# Patient Record
Sex: FEMALE | Race: BLACK OR AFRICAN AMERICAN | Employment: FULL TIME | URBAN - METROPOLITAN AREA
[De-identification: names, ages, dates, MRNs, and addresses within clinical notes are randomized per-mention and may not be internally consistent; named-entity substitution may affect disease eponyms.]

---

## 2022-03-19 PROBLEM — E66.9 SUPER OBESITY: Status: ACTIVE | Noted: 2021-11-24

## 2023-07-27 DIAGNOSIS — K91.2 POSTOPERATIVE MALABSORPTION: Primary | ICD-10-CM

## 2023-08-16 LAB
25(OH)D3+25(OH)D2 SERPL-MCNC: 32.2 NG/ML (ref 30–100)
ALBUMIN SERPL-MCNC: 4.1 G/DL (ref 3.9–4.9)
ALBUMIN/GLOB SERPL: 1.7 {RATIO} (ref 1.2–2.2)
ALP SERPL-CCNC: 62 IU/L (ref 44–121)
ALT SERPL-CCNC: 15 IU/L (ref 0–32)
AST SERPL-CCNC: 20 IU/L (ref 0–40)
BASOPHILS # BLD AUTO: 0.1 X10E3/UL (ref 0–0.2)
BASOPHILS NFR BLD AUTO: 1 %
BILIRUB DIRECT SERPL-MCNC: <0.1 MG/DL (ref 0–0.4)
BILIRUB SERPL-MCNC: 0.3 MG/DL (ref 0–1.2)
BUN SERPL-MCNC: 11 MG/DL (ref 6–24)
BUN/CREAT SERPL: 15 (ref 9–23)
CALCIUM SERPL-MCNC: 8.9 MG/DL (ref 8.7–10.2)
CHLORIDE SERPL-SCNC: 107 MMOL/L (ref 96–106)
CHOLEST SERPL-MCNC: 162 MG/DL (ref 100–199)
CO2 SERPL-SCNC: 19 MMOL/L (ref 20–29)
CREAT SERPL-MCNC: 0.72 MG/DL (ref 0.57–1)
EGFRCR SERPLBLD CKD-EPI 2021: 103 ML/MIN/1.73
EOSINOPHIL # BLD AUTO: 0.2 X10E3/UL (ref 0–0.4)
EOSINOPHIL NFR BLD AUTO: 3 %
ERYTHROCYTE [DISTWIDTH] IN BLOOD BY AUTOMATED COUNT: 14.5 % (ref 11.7–15.4)
FERRITIN SERPL-MCNC: 11 NG/ML (ref 15–150)
GLOBULIN SER CALC-MCNC: 2.4 G/DL (ref 1.5–4.5)
GLUCOSE SERPL-MCNC: 78 MG/DL (ref 70–99)
HBA1C MFR BLD: 5.4 % (ref 4.8–5.6)
HCT VFR BLD AUTO: 33.4 % (ref 34–46.6)
HDLC SERPL-MCNC: 69 MG/DL
HGB BLD-MCNC: 10.5 G/DL (ref 11.1–15.9)
IMM GRANULOCYTES # BLD AUTO: 0 X10E3/UL (ref 0–0.1)
IMM GRANULOCYTES NFR BLD AUTO: 0 %
IRON SERPL-MCNC: 22 UG/DL (ref 27–159)
LDLC SERPL CALC-MCNC: 78 MG/DL (ref 0–99)
LYMPHOCYTES # BLD AUTO: 2.5 X10E3/UL (ref 0.7–3.1)
LYMPHOCYTES NFR BLD AUTO: 41 %
MAGNESIUM SERPL-MCNC: 2.1 MG/DL (ref 1.6–2.3)
MCH RBC QN AUTO: 27.2 PG (ref 26.6–33)
MCHC RBC AUTO-ENTMCNC: 31.4 G/DL (ref 31.5–35.7)
MCV RBC AUTO: 87 FL (ref 79–97)
MONOCYTES # BLD AUTO: 0.5 X10E3/UL (ref 0.1–0.9)
MONOCYTES NFR BLD AUTO: 9 %
NEUTROPHILS # BLD AUTO: 2.8 X10E3/UL (ref 1.4–7)
NEUTROPHILS NFR BLD AUTO: 46 %
PLATELET # BLD AUTO: 232 X10E3/UL (ref 150–450)
POTASSIUM SERPL-SCNC: 4 MMOL/L (ref 3.5–5.2)
PROT SERPL-MCNC: 6.5 G/DL (ref 6–8.5)
RBC # BLD AUTO: 3.86 X10E6/UL (ref 3.77–5.28)
SODIUM SERPL-SCNC: 142 MMOL/L (ref 134–144)
SPECIMEN STATUS REPORT: NORMAL
TRIGL SERPL-MCNC: 77 MG/DL (ref 0–149)
TSH SERPL DL<=0.005 MIU/L-ACNC: 2.46 UIU/ML (ref 0.45–4.5)
VIT B12 SERPL-MCNC: 1830 PG/ML (ref 232–1245)
VLDLC SERPL CALC-MCNC: 15 MG/DL (ref 5–40)
WBC # BLD AUTO: 6 X10E3/UL (ref 3.4–10.8)

## 2023-08-18 LAB — VIT B1 BLD-SCNC: 121.7 NMOL/L (ref 66.5–200)

## 2023-08-28 ENCOUNTER — OFFICE VISIT (OUTPATIENT)
Age: 48
End: 2023-08-28
Payer: COMMERCIAL

## 2023-08-28 VITALS
BODY MASS INDEX: 40.64 KG/M2 | OXYGEN SATURATION: 100 % | SYSTOLIC BLOOD PRESSURE: 135 MMHG | HEART RATE: 56 BPM | WEIGHT: 229.4 LBS | HEIGHT: 63 IN | TEMPERATURE: 98.1 F | RESPIRATION RATE: 16 BRPM | DIASTOLIC BLOOD PRESSURE: 72 MMHG

## 2023-08-28 DIAGNOSIS — L98.7 EXCESSIVE AND REDUNDANT SKIN AND SUBCUTANEOUS TISSUE: ICD-10-CM

## 2023-08-28 DIAGNOSIS — Z98.84 S/P GASTRIC BYPASS: ICD-10-CM

## 2023-08-28 DIAGNOSIS — K91.2 POST-RESECTION MALABSORPTION: Primary | ICD-10-CM

## 2023-08-28 DIAGNOSIS — E66.01 MORBID OBESITY (HCC): ICD-10-CM

## 2023-08-28 PROCEDURE — 99213 OFFICE O/P EST LOW 20 MIN: CPT | Performed by: NURSE PRACTITIONER

## 2023-08-28 ASSESSMENT — ENCOUNTER SYMPTOMS
BLOOD IN STOOL: 0
ABDOMINAL PAIN: 0
NAUSEA: 0
CONSTIPATION: 0
DIARRHEA: 0
VOMITING: 0

## 2023-08-28 NOTE — PROGRESS NOTES
Bariatric Postoperative Progress Note    CC: Annual Bariatric Follow Up    Francia Matos is a 49 y.o. female now 21 months status post laparoscopic gastric bypass surgery performed on 11/24/2021. Lost to follow up since 3 month post-op visit. Diet consists of protein shake, tuna, yogurt, chicken, vegetables, peanut butter crackers. Taking in 60 oz sugar free fluids,  30+ g protein. Walking twice weekly. Bowel movements are regular. She is compliant with recommended bariatric vitamin supplementation, taking Flintstones MVI 2-3 daily and B complex, not taking calcium. Going to [facility] and getting B12 & vitamin D injection 2-3 times monthly  Has been struggling with depression on and off since surgery. She reports periods of time where she will not want to get out of bed and will sleep all day. Denies SI/HI and will follow up with PCP for this on 8/31  Experiencing itching and skin irritation under pannus from excess skin chafing even with fastidious skin care, use of support garments, and Nystatin. Goal weight 215  Denies any NSAID, ETOH, or tobacco use. Weight Loss Metrics 8/28/2023 2/28/2022 12/10/2021 11/23/2021 10/15/2021 4/2/2021   Pre-op weight (manual entry) 309 lbs - - - - -   Height 5' 3\" 5' 3\" 5' 3\" 5' 3\" 5' 3\" 5' 3\"   Weight 229 lbs 6 oz 265 lbs 292 lbs 309 lbs 309 lbs 320 lbs   BMI (Calculated) 40.7 kg/m2 47 kg/m2 51.8 kg/m2 54.9 kg/m2 54.9 kg/m2 56.8 kg/m2   Ideal body weight (manual entry) 128 lbs - - - - -   EBW in lbs. 181 - - - - -   Weight loss to date in lbs.  79 - - - - -   Percent weight loss (%) 25.76 % - - - - -   Percent EBW loss (%) 43.6 % - - - - -       Comorbidities:  Hypertension: resolved  Diabetes: not applicable  Obstructive Sleep Apnea: resolved  Hyperlipidemia: not applicable  Stress Urinary Incontinence: not applicable  Gastroesophageal Reflux: not applicable  Weight related arthropathy:improved      Past Medical History:   Diagnosis Date    Asthma

## 2023-08-29 ASSESSMENT — ENCOUNTER SYMPTOMS
WHEEZING: 0
EYES NEGATIVE: 1
ABDOMINAL DISTENTION: 0
SHORTNESS OF BREATH: 0
COUGH: 0

## 2023-10-23 ENCOUNTER — OFFICE VISIT (OUTPATIENT)
Age: 48
End: 2023-10-23
Payer: COMMERCIAL

## 2023-10-23 VITALS
WEIGHT: 224 LBS | HEIGHT: 63 IN | OXYGEN SATURATION: 100 % | TEMPERATURE: 97 F | BODY MASS INDEX: 39.69 KG/M2 | SYSTOLIC BLOOD PRESSURE: 114 MMHG | HEART RATE: 59 BPM | DIASTOLIC BLOOD PRESSURE: 83 MMHG

## 2023-10-23 DIAGNOSIS — K90.821 SHORT BOWEL SYNDROME WITH COLON IN CONTINUITY: Primary | ICD-10-CM

## 2023-10-23 DIAGNOSIS — R53.83 OTHER FATIGUE: ICD-10-CM

## 2023-10-23 DIAGNOSIS — E66.9 CLASS 2 OBESITY WITHOUT SERIOUS COMORBIDITY WITH BODY MASS INDEX (BMI) OF 39.0 TO 39.9 IN ADULT, UNSPECIFIED OBESITY TYPE: ICD-10-CM

## 2023-10-23 DIAGNOSIS — Z71.3 ENCOUNTER FOR WEIGHT LOSS COUNSELING: ICD-10-CM

## 2023-10-23 DIAGNOSIS — Z98.84 S/P GASTRIC BYPASS: ICD-10-CM

## 2023-10-23 PROCEDURE — 99213 OFFICE O/P EST LOW 20 MIN: CPT | Performed by: NURSE PRACTITIONER

## 2023-10-23 RX ORDER — NEEDLES, DISPOSABLE 25GX5/8"
NEEDLE, DISPOSABLE MISCELLANEOUS
Qty: 10 EACH | Refills: 0 | Status: CANCELLED | OUTPATIENT
Start: 2023-10-23

## 2023-10-23 RX ORDER — ESCITALOPRAM OXALATE 20 MG/1
20 TABLET ORAL
COMMUNITY
Start: 2023-10-14

## 2023-10-23 RX ORDER — CYANOCOBALAMIN 1000 UG/ML
1000 INJECTION, SOLUTION INTRAMUSCULAR; SUBCUTANEOUS
Qty: 3 ML | Refills: 1 | Status: SHIPPED | OUTPATIENT
Start: 2023-10-23

## 2023-10-23 NOTE — PROGRESS NOTES
Bariatric Postoperative Note    CC: Weight Management    Kim Allen is a 49 y.o. female now 23 months status post laparoscopic gastric bypass surgery performed on 11/24/21.  -5 lbs  Doing well overall. Diet consists of deli meat, tuna, chicken, salad, bread, pasta, occ chips, crackers. Taking in 50 oz sugar free fluids,  unknown g protein. Not currently exercising. Bowel movements are regular. She is compliant with recommended bariatric vitamin supplementation. Has been logging food intake for past week, brought in for review. Focusing on protein and avoiding snacking. PCP started on Lexapro and she will start working with a therapist next week. 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 Continues to experience itching and skin irritation under pannus from excess skin chafing even with fastidious skin care, use of support garments, and Nystatin. She was referred to Dr. Ahmet Pina for panniculectomy, requesting office notes to be faxed to his office. 10/23/2023    10:03 AM 8/28/2023     1:16 PM 2/28/2022     9:43 AM 12/10/2021     1:58 PM 11/23/2021     9:40 AM 10/15/2021     3:13 PM 4/2/2021     2:20 PM   Weight Loss Metrics   Pre-op weight (manual entry) 309 lbs 309 lbs        Height 5' 3\" 5' 3\" 5' 3\" 5' 3\" 5' 3\" 5' 3\" 5' 3\"   Weight - Scale 224 lbs 229 lbs 6 oz 265 lbs 292 lbs 309 lbs 309 lbs 320 lbs   BMI (Calculated) 39.8 kg/m2 40.7 kg/m2 47 kg/m2 51.8 kg/m2 54.9 kg/m2 54.9 kg/m2 56.8 kg/m2   Ideal body weight (manual entry) 128 lbs 128 lbs        EBW in lbs. 181 181        Weight loss to date in lbs.  85 79        Percent weight loss (%) 27.51 % 25.76 %        Percent EBW loss (%) 47 % 43.6 %            Comorbidities:      Past Medical History:   Diagnosis Date    Asthma     Hypertension     Sleep apnea        Past Surgical History:   Procedure Laterality Date    GI      gastric bypass    GYN      leep procedure       Current Outpatient Medications

## 2023-10-26 ASSESSMENT — ENCOUNTER SYMPTOMS
SHORTNESS OF BREATH: 0
CONSTIPATION: 0
COUGH: 0
NAUSEA: 0
VOMITING: 0
ABDOMINAL PAIN: 0
DIARRHEA: 0

## 2023-11-24 ENCOUNTER — HOSPITAL ENCOUNTER (OUTPATIENT)
Facility: HOSPITAL | Age: 48
Discharge: HOME | End: 2023-11-27

## 2023-11-24 LAB — LABCORP SPECIMEN COLLECTION: NORMAL

## 2024-06-20 ENCOUNTER — HOSPITAL ENCOUNTER (OUTPATIENT)
Facility: HOSPITAL | Age: 49
Discharge: HOME | End: 2024-06-23

## 2024-06-20 LAB — LABCORP SPECIMEN COLLECTION: NORMAL

## 2024-06-20 PROCEDURE — 99001 SPECIMEN HANDLING PT-LAB: CPT

## 2024-10-01 ENCOUNTER — CLINICAL DOCUMENTATION (OUTPATIENT)
Facility: HOSPITAL | Age: 49
End: 2024-10-01